# Patient Record
Sex: FEMALE | Race: WHITE | NOT HISPANIC OR LATINO | Employment: FULL TIME
[De-identification: names, ages, dates, MRNs, and addresses within clinical notes are randomized per-mention and may not be internally consistent; named-entity substitution may affect disease eponyms.]

---

## 2017-09-17 ENCOUNTER — HEALTH MAINTENANCE LETTER (OUTPATIENT)
Age: 32
End: 2017-09-17

## 2019-11-09 ENCOUNTER — HEALTH MAINTENANCE LETTER (OUTPATIENT)
Age: 34
End: 2019-11-09

## 2019-12-16 ENCOUNTER — RECORDS - HEALTHEAST (OUTPATIENT)
Dept: ADMINISTRATIVE | Facility: OTHER | Age: 34
End: 2019-12-16

## 2019-12-18 ENCOUNTER — RECORDS - HEALTHEAST (OUTPATIENT)
Dept: ADMINISTRATIVE | Facility: OTHER | Age: 34
End: 2019-12-18

## 2020-01-07 ENCOUNTER — RECORDS - HEALTHEAST (OUTPATIENT)
Dept: ADMINISTRATIVE | Facility: OTHER | Age: 35
End: 2020-01-07

## 2020-02-23 ENCOUNTER — HEALTH MAINTENANCE LETTER (OUTPATIENT)
Age: 35
End: 2020-02-23

## 2020-04-24 ENCOUNTER — RECORDS - HEALTHEAST (OUTPATIENT)
Dept: ADMINISTRATIVE | Facility: OTHER | Age: 35
End: 2020-04-24

## 2020-12-06 ENCOUNTER — HEALTH MAINTENANCE LETTER (OUTPATIENT)
Age: 35
End: 2020-12-06

## 2021-09-03 ENCOUNTER — TRANSFERRED RECORDS (OUTPATIENT)
Dept: HEALTH INFORMATION MANAGEMENT | Facility: CLINIC | Age: 36
End: 2021-09-03

## 2021-09-26 ENCOUNTER — HEALTH MAINTENANCE LETTER (OUTPATIENT)
Age: 36
End: 2021-09-26

## 2022-01-15 ENCOUNTER — HEALTH MAINTENANCE LETTER (OUTPATIENT)
Age: 37
End: 2022-01-15

## 2022-03-08 ENCOUNTER — TRANSFERRED RECORDS (OUTPATIENT)
Dept: HEALTH INFORMATION MANAGEMENT | Facility: CLINIC | Age: 37
End: 2022-03-08
Payer: COMMERCIAL

## 2022-03-14 ENCOUNTER — MEDICAL CORRESPONDENCE (OUTPATIENT)
Dept: HEALTH INFORMATION MANAGEMENT | Facility: CLINIC | Age: 37
End: 2022-03-14
Payer: COMMERCIAL

## 2022-03-15 ENCOUNTER — MEDICAL CORRESPONDENCE (OUTPATIENT)
Dept: HEALTH INFORMATION MANAGEMENT | Facility: CLINIC | Age: 37
End: 2022-03-15
Payer: COMMERCIAL

## 2022-03-16 ENCOUNTER — TRANSCRIBE ORDERS (OUTPATIENT)
Dept: MATERNAL FETAL MEDICINE | Facility: CLINIC | Age: 37
End: 2022-03-16
Payer: COMMERCIAL

## 2022-03-16 DIAGNOSIS — O26.90 PREGNANCY RELATED CONDITION: Primary | ICD-10-CM

## 2022-03-29 ENCOUNTER — TRANSCRIBE ORDERS (OUTPATIENT)
Dept: MATERNAL FETAL MEDICINE | Facility: CLINIC | Age: 37
End: 2022-03-29
Payer: COMMERCIAL

## 2022-03-29 DIAGNOSIS — O26.90 PREGNANCY RELATED CONDITION, ANTEPARTUM: Primary | ICD-10-CM

## 2022-03-29 DIAGNOSIS — O09.92 HIGH-RISK PREGNANCY IN SECOND TRIMESTER: ICD-10-CM

## 2022-03-29 DIAGNOSIS — K50.90 CROHN'S DISEASE (H): Primary | ICD-10-CM

## 2022-03-29 DIAGNOSIS — Z90.49 STATUS POST SMALL BOWEL RESECTION: ICD-10-CM

## 2022-04-08 ENCOUNTER — TRANSFERRED RECORDS (OUTPATIENT)
Dept: HEALTH INFORMATION MANAGEMENT | Facility: CLINIC | Age: 37
End: 2022-04-08
Payer: COMMERCIAL

## 2022-05-17 ENCOUNTER — PRE VISIT (OUTPATIENT)
Dept: MATERNAL FETAL MEDICINE | Facility: CLINIC | Age: 37
End: 2022-05-17
Payer: COMMERCIAL

## 2022-05-24 ENCOUNTER — HOSPITAL ENCOUNTER (OUTPATIENT)
Dept: ULTRASOUND IMAGING | Facility: CLINIC | Age: 37
Discharge: HOME | End: 2022-05-24
Attending: OBSTETRICS & GYNECOLOGY
Payer: COMMERCIAL

## 2022-05-24 ENCOUNTER — OFFICE VISIT (OUTPATIENT)
Dept: MATERNAL FETAL MEDICINE | Facility: CLINIC | Age: 37
End: 2022-05-24
Attending: ADVANCED PRACTICE MIDWIFE
Payer: COMMERCIAL

## 2022-05-24 DIAGNOSIS — O09.92 HIGH-RISK PREGNANCY IN SECOND TRIMESTER: ICD-10-CM

## 2022-05-24 DIAGNOSIS — O09.522 MULTIGRAVIDA OF ADVANCED MATERNAL AGE IN SECOND TRIMESTER: ICD-10-CM

## 2022-05-24 DIAGNOSIS — K50.90 CROHN'S DISEASE (H): ICD-10-CM

## 2022-05-24 DIAGNOSIS — O35.03X0 CHOROID PLEXUS CYST OF FETUS AFFECTING CARE OF MOTHER, ANTEPARTUM, NOT APPLICABLE OR UNSPECIFIED FETUS: ICD-10-CM

## 2022-05-24 DIAGNOSIS — O26.90 PREGNANCY RELATED CONDITION, ANTEPARTUM: Primary | ICD-10-CM

## 2022-05-24 DIAGNOSIS — Z90.49 STATUS POST SMALL BOWEL RESECTION: ICD-10-CM

## 2022-05-24 PROCEDURE — 76811 OB US DETAILED SNGL FETUS: CPT | Mod: 26 | Performed by: OBSTETRICS & GYNECOLOGY

## 2022-05-24 PROCEDURE — 99202 OFFICE O/P NEW SF 15 MIN: CPT | Mod: 25 | Performed by: OBSTETRICS & GYNECOLOGY

## 2022-05-24 PROCEDURE — 76811 OB US DETAILED SNGL FETUS: CPT

## 2022-05-24 RX ORDER — MONTELUKAST SODIUM 10 MG/1
1 TABLET ORAL AT BEDTIME
COMMUNITY
Start: 2021-02-05

## 2022-05-24 RX ORDER — SYRINGE W-NEEDLE,DISPOSAB,3 ML 25GX5/8"
SYRINGE, EMPTY DISPOSABLE MISCELLANEOUS
COMMUNITY
Start: 2021-10-20

## 2022-05-24 NOTE — NURSING NOTE
Cecily, accompanied by her SO, here in clinic for L2 and MFM consult. Dr. Leger and Dr. Bruce met with pt (see consult note).  Jackie Morle RN

## 2022-05-24 NOTE — PROGRESS NOTES
Maternal-Fetal Medicine Consultation    Cecily Hartman  : 1985  MRN: [id number]    REFERRAL:  Cecily Hartman is a 37 year old sent by Ms. Chavira from [facility] for MFM consultation.    HPI:  Cecily Hartman is a 37 year old  at 19w4d by LMP consistent with 8w4d US here for MFM consultation regarding Crohn's disease.    She is here with her partner, Pito.    She was diagnosed with Crohn's disease 2003. She presented with abdominal pain and underwent colonoscopy, which was normal. She was then diagnosed with a perforated small bowel and underwent small bowel resection, in which 1 foot of the bowel was removed, including the foot of the terminal ileum. She has been intermittently been seen by GI at [place], Dr. Dong. Had a colonoscopy and biopsy on 2019, which was consistent with Crohn's. She is not currently on any medications for her Crohn' disease. She has done very well and has not had any flares recently. She notes adequate control during her previous pregnancies. She has done glucose tolerance tests in her previous pregnancies without dumping syndrome. She was previously diagnosed with B12 deficiency, though on her last check on 3/8/2022, it was normal at 246.     Prenatal Care:  Primary OB care this pregnancy has been with MsMansi Fan from [facility].    Obstetrics History:  OB History    Para Term  AB Living   4 3 3 0 0 3   SAB IAB Ectopic Multiple Live Births   0 0 0 0 3      # Outcome Date GA Lbr Yoav/2nd Weight Sex Delivery Anes PTL Lv   4 Current            3 Term 14 38w1d  3.314 kg (7 lb 4.9 oz) M Vag-Spont  N EHSAN      Apgar1: 9  Apgar5: 9   2 Term 09 39w0d  3.785 kg (8 lb 5.5 oz) M Vag-Spont   EHSAN      Birth Comments: LML, no extension, near brow presentation      Name: Randy      Apgar1: 9  Apgar5: 9   1 Term 07 37w0d 04:00 3.487 kg (7 lb 11 oz) M Vag-Spont  Y EHSAN      Birth Comments: 4th depgree laceration      Name: Xavi       Apgar1: 9  Apgar5: 9       Past Medical History:  Past Medical History:   Diagnosis Date     Bowel perforation (H) 2003     Childhood asthma      Crohn's disease (H) 2003    Primary GI: Geeta Cooper       Past Surgical History:  Past Surgical History:   Procedure Laterality Date     CARDIAC ELECTROPHYSIOLOGY MAPPING AND ABLATION  2013    HX SVT     OTHER SURGICAL HISTORY  2012    Cardiac Ablation     SMALL BOWEL RESECTION  2003    Along with an Appy     [facility] APPENDECTOMY  2003    Description: Appendectomy;  Recorded: 11/19/2009;     [facility] ORAL SURGERY PROCEDURE  2001    4 wisdom teeth     [facility] RESECT SMALL INTEST,SINGL RESEC/ANAS  2003    Description: Small Bowel Resection;  Recorded: 11/19/2009;       Current Medications:  Prior to Admission medications    Medication Sig Last Dose Taking? Auth Provider   FOLIC ACID 1 MG OR TABS ONE DAILY   Abdi Cheung MD   PENTASA 500 MG OR CPCR 2 tabs 4 times a day   Abdi Cheung MD   PRENATAL VITAMIN TABS   OR ONE DAILY   Abdi Cheung MD   VITAMIN B-12 1000 MCG OR TABS    Abdi Cheung MD   ZANTAC 150 MG OR TABS ONE TABLET TWICE DAILY   Abdi Cheung MD       Allergies:  Patient has no known allergies.    Social History:   Occupation: Nurse practioner at [employer]  Denies use of alcohol, drugs or smoking.    Family History:  Denies history of genetic disorders, preeclampsia, thromboembolic disease, bleeding disorders, developmental delay.    ROS:  10-point ROS negative except as in HPI     PHYSICAL EXAM:  Deferred     Other Imaging:   PREGNANCY  ---------------------------------------------------------------------------------------------------------  Tavera pregnancy. Number of fetuses: 1        DATING  ---------------------------------------------------------------------------------------------------------                                           Date                                Details                                                                                       Gest. age                      YASH  LMP                                  1/7/2022                                                                                                                           19 w + 4 d                     10/14/2022  Prior assessment               3/8/2022                          GA: 8 w + 4 d                                                                             19 w + 4 d                     10/14/2022  U/S                                   5/24/2022                         based upon AC, BPD, Femur, HC                                                19 w + 2 d                     10/16/2022  Assigned dating                  Dating performed on 05/24/2022, based on the LMP                                                            19 w + 4 d                     10/14/2022        GENERAL EVALUATION  ---------------------------------------------------------------------------------------------------------  Cardiac activity present.  bpm.  Fetal movements present.  Presentation cephalic.  Placenta Anterior, anterior, low lying.  Umbilical cord 3 vessel cord.  Amniotic fluid normal MVP, MVP 5.5 cm.        FETAL BIOMETRY  ---------------------------------------------------------------------------------------------------------  Main Fetal Biometry:  BPD                                        42.8                    mm                         19w 0d                Hadlock  OFD                                        60.1                    mm                         19w 4d                Nicolaides  HC                                          165.8                  mm                          19w 2d                Hadlock  Cerebellum tr                            20.7                   mm                          19w 5d                Nicolaides  AC                                          142.5                  mm                           19w 4d        46%        Hadlock  Femur                                      30.3                   mm                          19w 3d                Hadlock  Humerus                                  30.2                    mm                         20w 0d                Maryellen  Fetal Weight Calculation:  EFW                                       294                     g                                     38%        Hadlock  EFW (lb,oz)                             0 lb 10                 oz  EFW by                                        Hadlock (BPD-HC-AC-FL)  Head / Face / Neck Biometry:                                             5.8                     mm  CM                                          3.5                     mm  Nasal bone                               5.2                     mm  Nuchal fold                               5.0                     mm        FETAL ANATOMY  ---------------------------------------------------------------------------------------------------------  Head / Neck                         Left choroid plexus: cyst     The following structures appear normal:  Head / Neck                         Cranium. Head size. Head shape. Lateral ventricles. Midline falx. Cavum septi pellucidi. Cerebellum. Cisterna magna. Parenchyma. Thalami.                                             Vermis.                                             Neck. Nuchal fold.  Face                                   Lips. Profile. Nose. Maxilla. Mandible. Orbits. Lens.  Heart / Thorax                      4-chamber view. RVOT view. LVOT view. Situs. Aortic arch view. Bicaval view. Ductal arch view. Superior vena cava. Inferior vena cava. 3-vessel                                             view. 3-vessel-trachea view. Cardiac position. Cardiac size. Cardiac rhythm.                                             Right lung. Left lung. Diaphragm.  Abdomen                             Abdominal  wall. Cord insertion. Stomach. Kidneys. Bladder. Liver. Bowel. Genitals.  Spine                                  Cervical spine. Thoracic spine. Lumbar spine. Sacral spine.  Extremities / Skeleton          Right arm. Right hand. Left arm. Left hand. Right leg. Right foot. Left leg. Left foot.     Gender: female.        MATERNAL STRUCTURES  ---------------------------------------------------------------------------------------------------------  Cervix                                  Visualized                                             Appearance: Appears Closed                                             Cervical length 41.0 mm  Right Ovary                          Visualized  Left Ovary                            Visualized        RECOMMENDATION  ---------------------------------------------------------------------------------------------------------  Thank-you for referring your patient for a comprehensive ultrasound. She had low risk first trimester screen.     I discussed the findings on today's ultrasound with the patient. I reviewed the limitations of ultrasound both in detecting aneuploidy and structural abnormalities. We  discussed the finding of the unilateral choroid plexus cyst. We discussed that choroid plexus cysts are seen in normal pregnancies as an isolated finding with no clinically  significant impact. They can be unilateral or bilateral and result from the trapping of CSF within the choroid plexus. We reviewed that in the setting of her otherwise  structurally normal ultrasound today this is likely to be a normal variant and requires no additional follow-up. We also reviewed that this finding does not impact structural  brain development or function.     The placenta today is 1.3 cm from the cervical os, which is low lying. As pregnancy progresses this finding is very likely to resolve prior to delivery. A follow up ultrasound is  recommended in your office in the third trimester to reevaluate  fetal growth in the setting of maternal COVID infection in pregnancy.     Cecily also had a consultation today. Please see Epic for full documentation of that encounter.     Return to primary provider for continued prenatal care.     If you have questions regarding today's evaluation or if we can be of further service, please contact the [facility].     **Fetal anomalies may be present but not detected**                                                                         IMPRESSION  ---------------------------------------------------------------------------------------------------------  1) Tavera intrauterine pregnancy at 19w 4d gestational age.  2) There is a small choroid plexus cyst. Otherwise, none of the anomalies commonly detected by ultrasound were evident in the detailed fetal anatomic survey as described  above.  3) Growth parameters and estimated fetal weight were consistent with established dates.  4) The amniotic fluid volume appeared normal.  5) Normal fetal activity for gestational age.  6) On transabdominal imaging the cervix appears long and closed.      ASSESSMENT/PLAN:  Cecily Hartman is a 37 year old  at 19w4d by LMP consistent with 8w4d US here for [facility] consultation regarding:     Crohn's Disease  We discussed that risks of IBD in pregnancy include miscarriage, fetal growth restriction/small for gestational age infant, premature delivery, poor maternal weight gain, and complications of labor and delivery including increased risk of preeclampsia, abruption, and  delivery. Specifically these risks are significantly increased in the presence of active disease. We reviewed that given that Ms. Hartman's Crohn's disease has been relatively quiescent, her risks of these complications are quite low. Furthermore, she has tolerated previous pregnancies from a GI perspective well without flares. We discussed that given her terminal ileum involvement and prior  resection, she is at risk for vitamin B12 deficiency. She was checked at the beginning of this pregnancy and her Vitamin B12 level was notably normal.     We discussed that mode of delivery should be dictated by the usual obstetric indications unless the patient has a history of a prior rectovaginal fistula or there is evidence of active perianal disease, which can theoretically predispose to fistula formation if episiotomy or obstetrical/perineal laceration is incurred. Women with IBD who undergo  delivery should receive prophylactic lovenox during their hospitalization     Recommendations:  - She previously had low risk serum aneuploidy screening.  - She had a normal Level II ultrasound today.  - A follow up ultrasound in the third trimester is recommended due to maternal COVID in pregnancy and low lying placenta  - Given Crohn's disease, low dose aspirin for preeclampsia not recommended.    The patient was seen and evaluated with Dr. Leger.    Thank you for allowing us to participate in the care of your patient. Please do not hesitate to contact us if you have further questions regarding the management of your patient.       Yvette Bruce MD  Maternal Fetal Medicine Fellow    [facility] Attending Attestation    I have seen and evaluated the patient myself with the fellow. I spent a total of 20 minutes on the day of the encounter including counseling, coordination of care, review of records and documentation.    Natividad Leger MD  Maternal Fetal Medicine

## 2022-07-11 ENCOUNTER — MEDICAL CORRESPONDENCE (OUTPATIENT)
Dept: HEALTH INFORMATION MANAGEMENT | Facility: CLINIC | Age: 37
End: 2022-07-11

## 2022-07-15 ENCOUNTER — TRANSFERRED RECORDS (OUTPATIENT)
Dept: HEALTH INFORMATION MANAGEMENT | Facility: CLINIC | Age: 37
End: 2022-07-15

## 2022-07-15 ENCOUNTER — TRANSCRIBE ORDERS (OUTPATIENT)
Dept: MATERNAL FETAL MEDICINE | Facility: CLINIC | Age: 37
End: 2022-07-15

## 2022-07-15 DIAGNOSIS — O26.90 PREGNANCY RELATED CONDITION: Primary | ICD-10-CM

## 2022-08-05 ENCOUNTER — HOSPITAL ENCOUNTER (OUTPATIENT)
Dept: ULTRASOUND IMAGING | Facility: CLINIC | Age: 37
Discharge: HOME | End: 2022-08-05
Payer: COMMERCIAL

## 2022-08-05 ENCOUNTER — OFFICE VISIT (OUTPATIENT)
Dept: MATERNAL FETAL MEDICINE | Facility: CLINIC | Age: 37
End: 2022-08-05
Payer: COMMERCIAL

## 2022-08-05 DIAGNOSIS — O26.90 PREGNANCY RELATED CONDITION: ICD-10-CM

## 2022-08-05 DIAGNOSIS — K50.919 CROHN'S DISEASE WITH COMPLICATION, UNSPECIFIED GASTROINTESTINAL TRACT LOCATION (H): Primary | ICD-10-CM

## 2022-08-05 PROCEDURE — 76816 OB US FOLLOW-UP PER FETUS: CPT | Mod: 26 | Performed by: OBSTETRICS & GYNECOLOGY

## 2022-08-05 PROCEDURE — 76816 OB US FOLLOW-UP PER FETUS: CPT

## 2022-08-05 NOTE — PROGRESS NOTES
"Please see \"Imaging\" tab under \"Chart Review\" for details of today's visit.    Jie Haq MD PhD  Maternal Fetal Medicine     "

## 2022-10-07 ENCOUNTER — HOSPITAL ENCOUNTER (INPATIENT)
Facility: CLINIC | Age: 37
LOS: 1 days | Discharge: HOME | End: 2022-10-08
Attending: OBSTETRICS & GYNECOLOGY | Admitting: OBSTETRICS & GYNECOLOGY
Payer: COMMERCIAL

## 2022-10-07 ENCOUNTER — ANESTHESIA EVENT (OUTPATIENT)
Dept: OBGYN | Facility: CLINIC | Age: 37
End: 2022-10-07
Payer: COMMERCIAL

## 2022-10-07 ENCOUNTER — ANESTHESIA (OUTPATIENT)
Dept: OBGYN | Facility: CLINIC | Age: 37
End: 2022-10-07
Payer: COMMERCIAL

## 2022-10-07 LAB
ABO/RH(D): NORMAL
ANTIBODY SCREEN: NEGATIVE
HGB BLD-MCNC: 11.9 G/DL (ref 11.7–15.7)
SPECIMEN EXPIRATION DATE: NORMAL
T PALLIDUM AB SER QL: NONREACTIVE

## 2022-10-07 PROCEDURE — 250N000011 HC RX IP 250 OP 636: Performed by: ANESTHESIOLOGY

## 2022-10-07 PROCEDURE — 999N000016 HC STATISTIC ATTENDANCE AT DELIVERY

## 2022-10-07 PROCEDURE — 722N000001 HC LABOR CARE VAGINAL DELIVERY SINGLE

## 2022-10-07 PROCEDURE — 258N000003 HC RX IP 258 OP 636: Performed by: ANESTHESIOLOGY

## 2022-10-07 PROCEDURE — 3E0R3BZ INTRODUCTION OF ANESTHETIC AGENT INTO SPINAL CANAL, PERCUTANEOUS APPROACH: ICD-10-PCS | Performed by: ANESTHESIOLOGY

## 2022-10-07 PROCEDURE — 86901 BLOOD TYPING SEROLOGIC RH(D): CPT | Performed by: OBSTETRICS & GYNECOLOGY

## 2022-10-07 PROCEDURE — 85018 HEMOGLOBIN: CPT | Performed by: OBSTETRICS & GYNECOLOGY

## 2022-10-07 PROCEDURE — 86780 TREPONEMA PALLIDUM: CPT | Performed by: OBSTETRICS & GYNECOLOGY

## 2022-10-07 PROCEDURE — 00HU33Z INSERTION OF INFUSION DEVICE INTO SPINAL CANAL, PERCUTANEOUS APPROACH: ICD-10-PCS | Performed by: ANESTHESIOLOGY

## 2022-10-07 PROCEDURE — 250N000009 HC RX 250: Performed by: OBSTETRICS & GYNECOLOGY

## 2022-10-07 PROCEDURE — 258N000003 HC RX IP 258 OP 636: Performed by: OBSTETRICS & GYNECOLOGY

## 2022-10-07 PROCEDURE — 120N000001 HC R&B MED SURG/OB

## 2022-10-07 PROCEDURE — 250N000013 HC RX MED GY IP 250 OP 250 PS 637: Performed by: OBSTETRICS & GYNECOLOGY

## 2022-10-07 PROCEDURE — 250N000011 HC RX IP 250 OP 636: Performed by: OBSTETRICS & GYNECOLOGY

## 2022-10-07 PROCEDURE — 10907ZC DRAINAGE OF AMNIOTIC FLUID, THERAPEUTIC FROM PRODUCTS OF CONCEPTION, VIA NATURAL OR ARTIFICIAL OPENING: ICD-10-PCS | Performed by: OBSTETRICS & GYNECOLOGY

## 2022-10-07 PROCEDURE — 370N000003 HC ANESTHESIA WARD SERVICE

## 2022-10-07 RX ORDER — IBUPROFEN 800 MG/1
800 TABLET, FILM COATED ORAL
Status: DISCONTINUED | OUTPATIENT
Start: 2022-10-07 | End: 2022-10-07

## 2022-10-07 RX ORDER — METOCLOPRAMIDE HYDROCHLORIDE 5 MG/ML
10 INJECTION INTRAMUSCULAR; INTRAVENOUS EVERY 6 HOURS PRN
Status: DISCONTINUED | OUTPATIENT
Start: 2022-10-07 | End: 2022-10-07 | Stop reason: HOSPADM

## 2022-10-07 RX ORDER — LIDOCAINE 40 MG/G
CREAM TOPICAL
Status: DISCONTINUED | OUTPATIENT
Start: 2022-10-07 | End: 2022-10-07 | Stop reason: HOSPADM

## 2022-10-07 RX ORDER — ACETAMINOPHEN 325 MG/1
650 TABLET ORAL EVERY 4 HOURS PRN
Status: DISCONTINUED | OUTPATIENT
Start: 2022-10-07 | End: 2022-10-07 | Stop reason: HOSPADM

## 2022-10-07 RX ORDER — NALOXONE HYDROCHLORIDE 0.4 MG/ML
0.2 INJECTION, SOLUTION INTRAMUSCULAR; INTRAVENOUS; SUBCUTANEOUS
Status: DISCONTINUED | OUTPATIENT
Start: 2022-10-07 | End: 2022-10-07 | Stop reason: HOSPADM

## 2022-10-07 RX ORDER — MISOPROSTOL 200 UG/1
400 TABLET ORAL
Status: DISCONTINUED | OUTPATIENT
Start: 2022-10-07 | End: 2022-10-09 | Stop reason: HOSPADM

## 2022-10-07 RX ORDER — ONDANSETRON 2 MG/ML
4 INJECTION INTRAMUSCULAR; INTRAVENOUS EVERY 6 HOURS PRN
Status: DISCONTINUED | OUTPATIENT
Start: 2022-10-07 | End: 2022-10-07 | Stop reason: HOSPADM

## 2022-10-07 RX ORDER — TRANEXAMIC ACID 10 MG/ML
1 INJECTION, SOLUTION INTRAVENOUS EVERY 30 MIN PRN
Status: DISCONTINUED | OUTPATIENT
Start: 2022-10-07 | End: 2022-10-09 | Stop reason: HOSPADM

## 2022-10-07 RX ORDER — KETOROLAC TROMETHAMINE 30 MG/ML
30 INJECTION, SOLUTION INTRAMUSCULAR; INTRAVENOUS
Status: DISCONTINUED | OUTPATIENT
Start: 2022-10-07 | End: 2022-10-07

## 2022-10-07 RX ORDER — OXYTOCIN/0.9 % SODIUM CHLORIDE 30/500 ML
100-340 PLASTIC BAG, INJECTION (ML) INTRAVENOUS CONTINUOUS PRN
Status: DISCONTINUED | OUTPATIENT
Start: 2022-10-07 | End: 2022-10-09 | Stop reason: HOSPADM

## 2022-10-07 RX ORDER — OXYTOCIN 10 [USP'U]/ML
10 INJECTION, SOLUTION INTRAMUSCULAR; INTRAVENOUS
Status: DISCONTINUED | OUTPATIENT
Start: 2022-10-07 | End: 2022-10-07 | Stop reason: HOSPADM

## 2022-10-07 RX ORDER — METHYLERGONOVINE MALEATE 0.2 MG/ML
200 INJECTION INTRAVENOUS
Status: DISCONTINUED | OUTPATIENT
Start: 2022-10-07 | End: 2022-10-09 | Stop reason: HOSPADM

## 2022-10-07 RX ORDER — ONDANSETRON 4 MG/1
4 TABLET, ORALLY DISINTEGRATING ORAL EVERY 6 HOURS PRN
Status: DISCONTINUED | OUTPATIENT
Start: 2022-10-07 | End: 2022-10-07 | Stop reason: HOSPADM

## 2022-10-07 RX ORDER — ALUMINUM HYDROXIDE AND MAGNESIUM TRISILICATE 80; 14.2 MG/1; MG/1
2 TABLET, CHEWABLE ORAL EVERY 4 HOURS PRN
COMMUNITY

## 2022-10-07 RX ORDER — MODIFIED LANOLIN
OINTMENT (GRAM) TOPICAL
Status: DISCONTINUED | OUTPATIENT
Start: 2022-10-07 | End: 2022-10-09 | Stop reason: HOSPADM

## 2022-10-07 RX ORDER — OXYTOCIN 10 [USP'U]/ML
10 INJECTION, SOLUTION INTRAMUSCULAR; INTRAVENOUS
Status: DISCONTINUED | OUTPATIENT
Start: 2022-10-07 | End: 2022-10-09 | Stop reason: HOSPADM

## 2022-10-07 RX ORDER — CALCIUM CARBONATE 500 MG/1
500 TABLET, CHEWABLE ORAL DAILY PRN
Status: DISCONTINUED | OUTPATIENT
Start: 2022-10-07 | End: 2022-10-09 | Stop reason: HOSPADM

## 2022-10-07 RX ORDER — MISOPROSTOL 200 UG/1
400 TABLET ORAL
Status: DISCONTINUED | OUTPATIENT
Start: 2022-10-07 | End: 2022-10-07 | Stop reason: HOSPADM

## 2022-10-07 RX ORDER — OXYTOCIN/0.9 % SODIUM CHLORIDE 30/500 ML
340 PLASTIC BAG, INJECTION (ML) INTRAVENOUS CONTINUOUS PRN
Status: DISCONTINUED | OUTPATIENT
Start: 2022-10-07 | End: 2022-10-07 | Stop reason: HOSPADM

## 2022-10-07 RX ORDER — KETOROLAC TROMETHAMINE 30 MG/ML
30 INJECTION, SOLUTION INTRAMUSCULAR; INTRAVENOUS EVERY 6 HOURS PRN
Status: DISCONTINUED | OUTPATIENT
Start: 2022-10-07 | End: 2022-10-09 | Stop reason: HOSPADM

## 2022-10-07 RX ORDER — CARBOPROST TROMETHAMINE 250 UG/ML
250 INJECTION, SOLUTION INTRAMUSCULAR
Status: DISCONTINUED | OUTPATIENT
Start: 2022-10-07 | End: 2022-10-07 | Stop reason: HOSPADM

## 2022-10-07 RX ORDER — CARBOPROST TROMETHAMINE 250 UG/ML
250 INJECTION, SOLUTION INTRAMUSCULAR
Status: DISCONTINUED | OUTPATIENT
Start: 2022-10-07 | End: 2022-10-09 | Stop reason: HOSPADM

## 2022-10-07 RX ORDER — CITRIC ACID/SODIUM CITRATE 334-500MG
30 SOLUTION, ORAL ORAL
Status: DISCONTINUED | OUTPATIENT
Start: 2022-10-07 | End: 2022-10-07 | Stop reason: HOSPADM

## 2022-10-07 RX ORDER — MISOPROSTOL 200 UG/1
800 TABLET ORAL
Status: DISCONTINUED | OUTPATIENT
Start: 2022-10-07 | End: 2022-10-09 | Stop reason: HOSPADM

## 2022-10-07 RX ORDER — FENTANYL CITRATE 50 UG/ML
INJECTION, SOLUTION INTRAMUSCULAR; INTRAVENOUS
Status: DISCONTINUED
Start: 2022-10-07 | End: 2022-10-07 | Stop reason: WASHOUT

## 2022-10-07 RX ORDER — HYDROCORTISONE 25 MG/G
CREAM TOPICAL 3 TIMES DAILY PRN
Status: DISCONTINUED | OUTPATIENT
Start: 2022-10-07 | End: 2022-10-09 | Stop reason: HOSPADM

## 2022-10-07 RX ORDER — FENTANYL CITRATE-0.9 % NACL/PF 10 MCG/ML
100 PLASTIC BAG, INJECTION (ML) INTRAVENOUS EVERY 5 MIN PRN
Status: DISCONTINUED | OUTPATIENT
Start: 2022-10-07 | End: 2022-10-07 | Stop reason: HOSPADM

## 2022-10-07 RX ORDER — OXYTOCIN/0.9 % SODIUM CHLORIDE 30/500 ML
1-24 PLASTIC BAG, INJECTION (ML) INTRAVENOUS CONTINUOUS
Status: DISCONTINUED | OUTPATIENT
Start: 2022-10-07 | End: 2022-10-07 | Stop reason: HOSPADM

## 2022-10-07 RX ORDER — METOCLOPRAMIDE 10 MG/1
10 TABLET ORAL EVERY 6 HOURS PRN
Status: DISCONTINUED | OUTPATIENT
Start: 2022-10-07 | End: 2022-10-07 | Stop reason: HOSPADM

## 2022-10-07 RX ORDER — ACETAMINOPHEN 325 MG/1
650 TABLET ORAL EVERY 4 HOURS PRN
Status: DISCONTINUED | OUTPATIENT
Start: 2022-10-07 | End: 2022-10-09 | Stop reason: HOSPADM

## 2022-10-07 RX ORDER — NALBUPHINE HYDROCHLORIDE 10 MG/ML
2.5-5 INJECTION, SOLUTION INTRAMUSCULAR; INTRAVENOUS; SUBCUTANEOUS EVERY 6 HOURS PRN
Status: DISCONTINUED | OUTPATIENT
Start: 2022-10-07 | End: 2022-10-09 | Stop reason: HOSPADM

## 2022-10-07 RX ORDER — MONTELUKAST SODIUM 10 MG/1
10 TABLET ORAL AT BEDTIME
Status: CANCELLED | OUTPATIENT
Start: 2022-10-07

## 2022-10-07 RX ORDER — PROCHLORPERAZINE 25 MG
25 SUPPOSITORY, RECTAL RECTAL EVERY 12 HOURS PRN
Status: DISCONTINUED | OUTPATIENT
Start: 2022-10-07 | End: 2022-10-07 | Stop reason: HOSPADM

## 2022-10-07 RX ORDER — PROCHLORPERAZINE MALEATE 10 MG
10 TABLET ORAL EVERY 6 HOURS PRN
Status: DISCONTINUED | OUTPATIENT
Start: 2022-10-07 | End: 2022-10-07 | Stop reason: HOSPADM

## 2022-10-07 RX ORDER — FENTANYL CITRATE 50 UG/ML
50 INJECTION, SOLUTION INTRAMUSCULAR; INTRAVENOUS EVERY 30 MIN PRN
Status: DISCONTINUED | OUTPATIENT
Start: 2022-10-07 | End: 2022-10-07 | Stop reason: HOSPADM

## 2022-10-07 RX ORDER — OXYTOCIN/0.9 % SODIUM CHLORIDE 30/500 ML
340 PLASTIC BAG, INJECTION (ML) INTRAVENOUS CONTINUOUS PRN
Status: DISCONTINUED | OUTPATIENT
Start: 2022-10-07 | End: 2022-10-09 | Stop reason: HOSPADM

## 2022-10-07 RX ORDER — TRANEXAMIC ACID 10 MG/ML
1 INJECTION, SOLUTION INTRAVENOUS EVERY 30 MIN PRN
Status: DISCONTINUED | OUTPATIENT
Start: 2022-10-07 | End: 2022-10-07 | Stop reason: HOSPADM

## 2022-10-07 RX ORDER — SODIUM CHLORIDE, SODIUM LACTATE, POTASSIUM CHLORIDE, CALCIUM CHLORIDE 600; 310; 30; 20 MG/100ML; MG/100ML; MG/100ML; MG/100ML
INJECTION, SOLUTION INTRAVENOUS CONTINUOUS
Status: DISCONTINUED | OUTPATIENT
Start: 2022-10-07 | End: 2022-10-09 | Stop reason: HOSPADM

## 2022-10-07 RX ORDER — ALBUTEROL SULFATE 90 UG/1
2 AEROSOL, METERED RESPIRATORY (INHALATION) EVERY 6 HOURS PRN
Status: DISCONTINUED | OUTPATIENT
Start: 2022-10-07 | End: 2022-10-09 | Stop reason: HOSPADM

## 2022-10-07 RX ORDER — SODIUM CHLORIDE, SODIUM LACTATE, POTASSIUM CHLORIDE, CALCIUM CHLORIDE 600; 310; 30; 20 MG/100ML; MG/100ML; MG/100ML; MG/100ML
INJECTION, SOLUTION INTRAVENOUS CONTINUOUS PRN
Status: DISCONTINUED | OUTPATIENT
Start: 2022-10-07 | End: 2022-10-07 | Stop reason: HOSPADM

## 2022-10-07 RX ORDER — DOCUSATE SODIUM 100 MG/1
100 CAPSULE, LIQUID FILLED ORAL DAILY
Status: DISCONTINUED | OUTPATIENT
Start: 2022-10-07 | End: 2022-10-09 | Stop reason: HOSPADM

## 2022-10-07 RX ORDER — NALOXONE HYDROCHLORIDE 0.4 MG/ML
0.4 INJECTION, SOLUTION INTRAMUSCULAR; INTRAVENOUS; SUBCUTANEOUS
Status: DISCONTINUED | OUTPATIENT
Start: 2022-10-07 | End: 2022-10-07 | Stop reason: HOSPADM

## 2022-10-07 RX ORDER — MISOPROSTOL 200 UG/1
800 TABLET ORAL
Status: DISCONTINUED | OUTPATIENT
Start: 2022-10-07 | End: 2022-10-07 | Stop reason: HOSPADM

## 2022-10-07 RX ORDER — BUPIVACAINE HYDROCHLORIDE 2.5 MG/ML
INJECTION, SOLUTION EPIDURAL; INFILTRATION; INTRACAUDAL
Status: COMPLETED | OUTPATIENT
Start: 2022-10-07 | End: 2022-10-07

## 2022-10-07 RX ORDER — METHYLERGONOVINE MALEATE 0.2 MG/ML
200 INJECTION INTRAVENOUS
Status: DISCONTINUED | OUTPATIENT
Start: 2022-10-07 | End: 2022-10-07 | Stop reason: HOSPADM

## 2022-10-07 RX ORDER — BISACODYL 10 MG
10 SUPPOSITORY, RECTAL RECTAL DAILY PRN
Status: DISCONTINUED | OUTPATIENT
Start: 2022-10-07 | End: 2022-10-09 | Stop reason: HOSPADM

## 2022-10-07 RX ADMIN — ACETAMINOPHEN 650 MG: 325 TABLET, FILM COATED ORAL at 22:21

## 2022-10-07 RX ADMIN — Medication 1 MILLI-UNITS/MIN: at 11:46

## 2022-10-07 RX ADMIN — DOCUSATE SODIUM 100 MG: 100 CAPSULE, LIQUID FILLED ORAL at 18:13

## 2022-10-07 RX ADMIN — BUPIVACAINE HYDROCHLORIDE 10 ML: 2.5 INJECTION, SOLUTION EPIDURAL; INFILTRATION; INTRACAUDAL at 13:18

## 2022-10-07 RX ADMIN — Medication 100 MCG: at 14:50

## 2022-10-07 RX ADMIN — Medication: at 10:00

## 2022-10-07 RX ADMIN — SODIUM CHLORIDE, POTASSIUM CHLORIDE, SODIUM LACTATE AND CALCIUM CHLORIDE: 600; 310; 30; 20 INJECTION, SOLUTION INTRAVENOUS at 17:39

## 2022-10-07 RX ADMIN — SODIUM CHLORIDE, POTASSIUM CHLORIDE, SODIUM LACTATE AND CALCIUM CHLORIDE: 600; 310; 30; 20 INJECTION, SOLUTION INTRAVENOUS at 09:04

## 2022-10-07 RX ADMIN — Medication 100 MCG: at 14:45

## 2022-10-07 RX ADMIN — BENZOCAINE: 11.4 AEROSOL, SPRAY TOPICAL at 18:13

## 2022-10-07 RX ADMIN — METHYLERGONOVINE MALEATE 200 MCG: 0.2 INJECTION, SOLUTION INTRAMUSCULAR; INTRAVENOUS at 15:10

## 2022-10-07 RX ADMIN — ACETAMINOPHEN 650 MG: 325 TABLET, FILM COATED ORAL at 18:02

## 2022-10-07 RX ADMIN — SODIUM CHLORIDE, POTASSIUM CHLORIDE, SODIUM LACTATE AND CALCIUM CHLORIDE 1000 ML: 600; 310; 30; 20 INJECTION, SOLUTION INTRAVENOUS at 09:30

## 2022-10-07 RX ADMIN — KETOROLAC TROMETHAMINE 30 MG: 30 INJECTION, SOLUTION INTRAMUSCULAR at 15:14

## 2022-10-07 RX ADMIN — KETOROLAC TROMETHAMINE 30 MG: 30 INJECTION, SOLUTION INTRAMUSCULAR at 21:14

## 2022-10-07 RX ADMIN — FENTANYL CITRATE 100 MCG: 50 INJECTION, SOLUTION INTRAMUSCULAR; INTRAVENOUS at 13:06

## 2022-10-07 RX ADMIN — Medication 100 MCG: at 14:38

## 2022-10-07 ASSESSMENT — ACTIVITIES OF DAILY LIVING (ADL)
ADLS_ACUITY_SCORE: 18

## 2022-10-07 NOTE — L&D DELIVERY NOTE
Delivery Date: 10/07/2022    This is a 37-year-old G4, P3-0-0-3, who was admitted for nonmedical induction of labor at 39 weeks' gestation.  The patient's pregnancy was complicated only by advanced maternal age with normal screening.  She had experienced a fourth-degree perineal laceration with her first vaginal delivery and had a right medial-lateral episiotomy to prevent this recurrence in subsequent pregnancies.  She has a history of Crohn's disease, but was stable throughout the pregnancy.  Upon admission, her cervix was found to be 2 cm dilated, 70 percent effaced, -2 station.  Artificial rupture of membranes was performed, yielding a large amount of clear fluid.  GBS was known to be negative.    She then progressed slowly in latent phase and 1 milliunits per minute of oxytocin was initiated, and she went quickly into active labor.  Her labor then progressed normally, with epidural anesthesia placed when she reached 7 cm dilation.  She then progressed to complete dilation, with fetus at +1 station.  Estimated fetal size was 8-1/2 pounds.  Due to strong pelvic pressure despite epidural, second stage was initiated at 1340.  She pushed effectively for approximately 1 hour.  Due to severe pelvic and hip pain, her epidural was then bolused.  After approximately 2 hours of pushing, the patient desired discussion regarding alternative options for delivery.  Pelvis felt to be adequate, and fetal presentation was felt to be straight occiput posterior.  Manual rotation was unsuccessful earlier in the second stage.  The patient was extensively counseled regarding the risks and benefits of an operative vaginal delivery, including the very significant increased risk of perineal lacerations.  With her history of previous fourth-degree laceration and concern regarding this, it was emphasized that this may occur again.  Additionally, the fetal risks were extensively discussed with the patient and her .  The risk of   section was also discussed.    After discussion, the patient opted for a trial of forceps.  The Chuck-Luikart forceps were placed in a phantom application, with the sagittal suture confirmed in the midline.  Fetal presentation was confirmed to be occiput posterior.  The bladder had been drained shortly prior to placement.  Three pulls through one contraction yielded the fetal head to .  The forceps were then removed without complication.  She continued to push through delivery of the majority of the fetal head, at which point a right medial lateral episiotomy was performed.  At this point, the remainder of the fetal head was delivered in a controlled fashion, followed quickly by the left/anterior fetal shoulder and the remainder of a vigorous infant female.  The NICU team was in the room for delivery.  Delayed cord clamping was performed given the vigorous nature of the infant.  Cord blood was then collected.  The placenta was delivered with vigorous uterine massage.  Good uterine tone was noted.  Due to a history of postpartum hemorrhage, the patient was given 0.2 mg of intramuscular Methergine.  IV oxytocin was also administered.    The medial lateral episiotomy was repaired with 3-0 Vicryl in a standard fashion.  Several additional figure-of-eight sutures of 4-0 Vicryl on an SH needle were performed for complete hemostasis.  Extensive rectal exam was performed with no evidence of compromise of previous repair.  Rectocele is noted vaginally.  Estimated blood loss from the delivery was 300 mL.    FINDINGS OF DELIVERY:  Liveborn infant female.  Apgars of 8 and 9.  Weight of 8 pounds 7 ounces.    Billie Fairbanks MD        D: 10/07/2022   T: 10/07/2022   MT: KRISTEL    Name:     FRANCISCO DIAL  MRN:      [id number]        Account:       [id number]   :      1985           Delivery Date: 10/07/2022     Document: [id number]

## 2022-10-07 NOTE — PROVIDER NOTIFICATION
10/07/22 1115   Provider Notification   Provider Name/Title Dr. Fairbanks   Method of Notification Phone   Request Evaluate in Person   Notification Reason Uterine Activity   Catalina every 4.5 minutes, rates pain at 2. Dr. Fairbanks updated per phone. Order received to begin Pitocin at 1 tamia units and increase by 1-2 tamia units as needed.

## 2022-10-07 NOTE — PLAN OF CARE
Data: Cecily Maradiaga transferred to [facility] via wheelchair at 1735. Baby transferred via parent's arms.  Action: Receiving unit notified of transfer: Yes. Patient and family notified of room change. Report given to JEOVANY Estrada at 1735. Belongings sent to receiving unit. Accompanied by Registered Nurse. Oriented patient to surroundings. Call light within reach. ID bands double-checked with receiving RN.  Response: Patient tolerated transfer and is stable.

## 2022-10-07 NOTE — PLAN OF CARE
Pt transferred from L & D to  at 1735 via wheelchair.  ID bracelets checked and verified after pt assisted to bed x 1.  Report received from JEOVANY Zhang - care of pt assumed. Room orientation completed with pt and spouse. Infant safety and safe sleep reviewed along with the clipboard education.      Vital signs stable. Fundus firm, lochia scant.  Pain managed with Tylenol as well as IV Toradol throughout her stay due to inability to safely take Ibuprofen with her Crohn's disease. Pt asked to continue IV fluids at 25 ml/hour due to concern that her IV will clot off without any fluids infusing. Pt is breastfeeding and bonding well with baby.  at bedside and supportive.     Patients mobililty level scored using the bedside mobility assistance tool (BMAT). Patient is at a mobility level test number: 3. Mobility equipment used: none required. Required assist of 1 staff members. Further use of BMAT scoring required.

## 2022-10-07 NOTE — PROVIDER NOTIFICATION
, 39 weeks gestation. Here for induction. Monitors explained and applied. History and prenatal reviewed. 08- Dr. Fairbanks at bedside. AROM, clear fluid. Cervix 2/-2. 0935- Activity encouraged. Up for a walk. 1015- Up in [facility].

## 2022-10-07 NOTE — ANESTHESIA PROCEDURE NOTES
Epidural catheter Procedure Note    Pre-Procedure   Staff -        Anesthesiologist:  Evangelist Hutchins MD       Performed By: anesthesiologist       Referred By: Magdi       Location: OB       Pre-Anesthestic Checklist: patient identified, IV checked, risks and benefits discussed, informed consent, monitors and equipment checked, pre-op evaluation, at physician/surgeon's request and post-op pain management  Timeout:       Correct Patient: Yes        Correct Procedure: Yes        Correct Site: Yes        Correct Position: Yes   Procedure Documentation  Procedure: epidural catheter       Patient Position: sitting       Patient Prep/Sterile Barriers: sterile gloves, mask, patient draped       Skin prep: Betadine       Local skin infiltrated with mL of 1% lidocaine.        Insertion Site: L3-4. (midline approach).       Technique: LORT saline        ANALY at 5 cm.       Needle Type: [employer] needle       Needle Gauge: 17.        Needle Length (Inches): 3.5        Catheter: 19 G.          Catheter threaded easily.         6 cm epidural space.         Threaded 11 cm at skin.         # of attempts: 1 and  # of redirects:     Assessment/Narrative         Paresthesias: No.       Test dose of 3 mL lidocaine 1.5% w/ 1:200,000 epinephrine at 13:15 CDT.         Test dose negative, 3 minutes after injection, for signs of intravascular, subdural, or intrathecal injection.       Insertion/Infusion Method: LORT saline       Aspiration negative for Heme or CSF via Epidural Catheter.    Medication(s) Administered   0.25% Bupivacaine PF (Epidural) - EPIDURAL   10 mL - 10/7/2022 1:18:00 PM

## 2022-10-07 NOTE — PROVIDER NOTIFICATION
1245- Admits to contractions not going away. Pitocin shut off, has had 900 ml fluid (if epidural needed). SVE 5-6/85/-2. Dr. Fairbanks pagemiriam, update given. Will come.

## 2022-10-07 NOTE — H&P
No significant change in general health status based on examination of the patient, review of Nursing Admission Database and prenatal record.    EFW: 7#10oz.     Billie Fairbanks MD

## 2022-10-07 NOTE — ANESTHESIA PREPROCEDURE EVALUATION
Anesthesia Pre-Procedure Evaluation    Patient: Cecily Maradiaga   MRN: [id number] : 1985        Procedure :           Past Medical History:   Diagnosis Date     Bowel perforation (H)      Childhood asthma      Crohn's disease (H)     Primary GI: Geeta Cooper      Past Surgical History:   Procedure Laterality Date     CARDIAC ELECTROPHYSIOLOGY MAPPING AND ABLATION      HX SVT     OTHER SURGICAL HISTORY  2012    Cardiac Ablation     SMALL BOWEL RESECTION      Along with an Appy     [facility] APPENDECTOMY      Description: Appendectomy;  Recorded: 2009;     [facility] ORAL SURGERY PROCEDURE      4 wisdom teeth     [facility] RESECT SMALL INTEST,SINGL RESEC/ANAS      Description: Small Bowel Resection;  Recorded: 2009;      Allergies   Allergen Reactions     Gluten Meal Other (See Comments)     Causes bloating     Lac Bovis Other (See Comments) and Nausea and Vomiting     Mouth sores       Social History     Tobacco Use     Smoking status: Never Smoker     Smokeless tobacco: Not on file   Substance Use Topics     Alcohol use: No      Wt Readings from Last 1 Encounters:   14 49 kg (108 lb)        Anesthesia Evaluation        No history of anesthetic complications       ROS/MED HX  ENT/Pulmonary:     (+) Intermittent, asthma     Neurologic:  - neg neurologic ROS     Cardiovascular:  - neg cardiovascular ROS     METS/Exercise Tolerance:     Hematologic:  - neg hematologic  ROS     Musculoskeletal:       GI/Hepatic:  - neg GI/hepatic ROS     Renal/Genitourinary:       Endo:  - neg endo ROS     Psychiatric/Substance Use:  - neg psychiatric ROS     Infectious Disease:       Malignancy:       Other:     (-) previous  and TOLAC candidate       Physical Exam    Airway        Mallampati: II   TM distance: > 3 FB   Neck ROM: full   Mouth opening: > 3 cm    Respiratory Devices and Support         Dental  no notable dental history         Cardiovascular   cardiovascular exam  normal          Pulmonary   pulmonary exam normal                OUTSIDE LABS:  CBC:   Lab Results   Component Value Date    HGB 11.9 10/07/2022    HGB 12.3 11/13/2008     BMP: No results found for: NA, POTASSIUM, CHLORIDE, CO2, BUN, CR, GLC  COAGS: No results found for: PTT, INR, FIBR  POC: No results found for: BGM, HCG, HCGS  HEPATIC: No results found for: ALBUMIN, PROTTOTAL, ALT, AST, GGT, ALKPHOS, BILITOTAL, BILIDIRECT, BRYCE  OTHER: No results found for: PH, LACT, A1C, ROMEL, PHOS, MAG, LIPASE, AMYLASE, TSH, T4, T3, CRP, SED    Anesthesia Plan    ASA Status:  2      Anesthesia Type: Epidural.              Consents    Anesthesia Plan(s) and associated risks, benefits, and realistic alternatives discussed. Questions answered and patient/representative(s) expressed understanding.    - Discussed:     - Discussed with:  Patient         Postoperative Care            Comments:           neg OB ROS.       Evangelist Hutchins MD

## 2022-10-07 NOTE — PROVIDER NOTIFICATION
1320- Epidural given. 1325- Dr. Fairbanks at bedside, SVE 8.5 cm and 0 station. 1340- Cervix complete and pushing with contractions. Pushing with stirrups, tried knee chest position, complains of pain and hips hurting. 1420, has made little progress with pushing. 1430- Dr. Hutchins here, epidural bolus given and infusion begun. BP low (see flow sheet), given Osman-synephrine. 1502-  team at bedside. Forceps applied, 4 pulls, off at 1503. Delivery at 1506, apgars 8 and 9 respectively.

## 2022-10-08 VITALS
SYSTOLIC BLOOD PRESSURE: 114 MMHG | TEMPERATURE: 98 F | RESPIRATION RATE: 16 BRPM | OXYGEN SATURATION: 90 % | HEART RATE: 95 BPM | DIASTOLIC BLOOD PRESSURE: 71 MMHG

## 2022-10-08 LAB — HGB BLD-MCNC: 9.8 G/DL (ref 11.7–15.7)

## 2022-10-08 PROCEDURE — 36415 COLL VENOUS BLD VENIPUNCTURE: CPT | Performed by: OBSTETRICS & GYNECOLOGY

## 2022-10-08 PROCEDURE — 250N000011 HC RX IP 250 OP 636: Performed by: OBSTETRICS & GYNECOLOGY

## 2022-10-08 PROCEDURE — 250N000013 HC RX MED GY IP 250 OP 250 PS 637: Performed by: OBSTETRICS & GYNECOLOGY

## 2022-10-08 PROCEDURE — 85018 HEMOGLOBIN: CPT | Performed by: OBSTETRICS & GYNECOLOGY

## 2022-10-08 RX ADMIN — KETOROLAC TROMETHAMINE 30 MG: 30 INJECTION, SOLUTION INTRAMUSCULAR at 03:19

## 2022-10-08 RX ADMIN — ALBUTEROL SULFATE 2 PUFF: 90 AEROSOL, METERED RESPIRATORY (INHALATION) at 12:29

## 2022-10-08 RX ADMIN — KETOROLAC TROMETHAMINE 30 MG: 30 INJECTION, SOLUTION INTRAMUSCULAR at 15:55

## 2022-10-08 RX ADMIN — ACETAMINOPHEN 650 MG: 325 TABLET, FILM COATED ORAL at 14:35

## 2022-10-08 RX ADMIN — ACETAMINOPHEN 650 MG: 325 TABLET, FILM COATED ORAL at 02:27

## 2022-10-08 RX ADMIN — KETOROLAC TROMETHAMINE 30 MG: 30 INJECTION, SOLUTION INTRAMUSCULAR at 22:48

## 2022-10-08 RX ADMIN — ALBUTEROL SULFATE 2 PUFF: 90 AEROSOL, METERED RESPIRATORY (INHALATION) at 07:54

## 2022-10-08 RX ADMIN — ACETAMINOPHEN 650 MG: 325 TABLET, FILM COATED ORAL at 18:32

## 2022-10-08 RX ADMIN — ALBUTEROL SULFATE 2 PUFF: 90 AEROSOL, METERED RESPIRATORY (INHALATION) at 18:32

## 2022-10-08 RX ADMIN — DOCUSATE SODIUM 100 MG: 100 CAPSULE, LIQUID FILLED ORAL at 08:03

## 2022-10-08 RX ADMIN — KETOROLAC TROMETHAMINE 30 MG: 30 INJECTION, SOLUTION INTRAMUSCULAR at 09:27

## 2022-10-08 RX ADMIN — ACETAMINOPHEN 650 MG: 325 TABLET, FILM COATED ORAL at 10:23

## 2022-10-08 RX ADMIN — ACETAMINOPHEN 650 MG: 325 TABLET, FILM COATED ORAL at 06:15

## 2022-10-08 ASSESSMENT — ACTIVITIES OF DAILY LIVING (ADL)
ADLS_ACUITY_SCORE: 18
DEPENDENT_IADLS:: INDEPENDENT
ADLS_ACUITY_SCORE: 18

## 2022-10-08 NOTE — PLAN OF CARE
VSS. Up ad roscoe. Breastfeeding and tolerating well, encouraged to call for help when needed. Scant amount of lochia, no clots noted. Tylenol and Toradol for pain control. BS audible/active x4, tolerating PO, denies N/V. Voiding. Bonding well with infant, attentive to cares. Significant other at bedside and supportive. Continue with plan of care.

## 2022-10-08 NOTE — PROGRESS NOTES
[facility]   Obstetrics Progress Note    Subjective: This is the patient's first day since Vaginal delivery. She is doing well.  She is urinating on her own. Pain is controlled with medication.    Objective:   All vitals stable  Temp: 98.6  F (37  C) Temp src: Oral BP: 108/67 Pulse: 79   Resp: 16 SpO2: 90 % O2 Device: None (Room air)      EXAM:  Constitutional: healthy, alert, no distress.   Abdomen: Abdomen soft, non-tender. BS normal. No masses, fundus is firm.  JOINT/EXTREMITIES: extremities normal     Last hemoglobin was   Hemoglobin   Date Value Ref Range Status   10/08/2022 9.8 (L) 11.7 - 15.7 g/dL Final   11/13/2008 12.3 11.7 - 15.7 g/dL Final   ]    Assessment: Stable postpartum course.    Plan: Routine care. Ambulation encouraged  Breast feeding strategies discussed  Pain control measures as needed  Reportable signs and symptoms dicussed with the patient  Discharge later today  PT would like her pain reassessed this afternoon to determine if she can continue with Tylenol oral for pain medication or if she would like to stay overnight to continue with Toradol.     Maru Wheeler MD

## 2022-10-08 NOTE — PLAN OF CARE
Vital signs stable. Pain managed with Toradol and Tylenol.  Pt is ambulating on her own, voiding without difficulty and independent in cares for self and baby.  Breastfeeding baby, going well.  at bedside. Positive interaction noted with baby.  Pt reports she may want to discharge early (today) if baby is cleared for discharge after 24 hour testing. Will continue to monitor.     Care continued from 1500 - 1930:  Pt remains stable. Bonding well with baby.  very supportive at bedside.  Pt is still deciding if she wants to discharge early tonight.  Discussing possible discharge with her .

## 2022-10-08 NOTE — ANESTHESIA POSTPROCEDURE EVALUATION
Patient: Cecily Maradiaga    Procedure: * No procedures listed *       Anesthesia Type:  Epidural    Note:  Disposition: Inpatient   Postop Pain Control:            Sign Out: Well controlled pain   PONV: No   Neuro/Psych: Uneventful            Sign Out: Acceptable/Baseline neuro status   Airway/Respiratory: Uneventful            Sign Out: Acceptable/Baseline resp. status   CV/Hemodynamics: Uneventful            Sign Out: Acceptable CV status; No obvious hypovolemia; No obvious fluid overload   Other NRE: NONE   DID A NON-ROUTINE EVENT OCCUR? No    Event details/Postop Comments:      S/P epidural for labor.   I or my partner was immediately available for management of this patient during epidural analgesia infusion.  VSS.  Doing well. Block resolved.  Neuro at baseline. Denies positional headache. Minimal side effects easily managed w/ PRN meds. No apparent anesthetic complications. No follow-up required.    JULISSA Ocasio MD             Last vitals:  Vitals:    10/07/22 2207 10/08/22 0732 10/08/22 1500   BP:  108/67 114/71   Pulse:  79 95   Resp: 18 16 16   Temp:  98.6  F (37  C) 98  F (36.7  C)   SpO2:          Electronically Signed By: Demond Ocasio MD  October 8, 2022  4:32 PM

## 2022-10-09 NOTE — PLAN OF CARE
Patient meeting expected outcomes and requesting to be discharged home tonight. Discharge orders in by MD earlier today. Discharge education completed, all questions answered. Discharged home with  and  infant at 2300.

## 2023-04-23 ENCOUNTER — HEALTH MAINTENANCE LETTER (OUTPATIENT)
Age: 38
End: 2023-04-23

## 2024-02-10 ENCOUNTER — HEALTH MAINTENANCE LETTER (OUTPATIENT)
Age: 39
End: 2024-02-10

## 2024-06-29 ENCOUNTER — HEALTH MAINTENANCE LETTER (OUTPATIENT)
Age: 39
End: 2024-06-29

## 2025-05-10 ENCOUNTER — ANCILLARY PROCEDURE (OUTPATIENT)
Dept: MAMMOGRAPHY | Facility: CLINIC | Age: 40
End: 2025-05-10
Attending: OBSTETRICS & GYNECOLOGY

## 2025-05-10 DIAGNOSIS — Z12.31 VISIT FOR SCREENING MAMMOGRAM: ICD-10-CM

## 2025-05-20 ENCOUNTER — HOSPITAL ENCOUNTER (OUTPATIENT)
Dept: MAMMOGRAPHY | Facility: CLINIC | Age: 40
Discharge: HOME | End: 2025-05-20
Attending: OBSTETRICS & GYNECOLOGY
Payer: COMMERCIAL

## 2025-05-20 DIAGNOSIS — Z12.31 VISIT FOR SCREENING MAMMOGRAM: ICD-10-CM

## 2025-05-20 PROCEDURE — 77063 BREAST TOMOSYNTHESIS BI: CPT

## 2025-05-28 DIAGNOSIS — R69 DIAGNOSIS UNKNOWN: Primary | ICD-10-CM

## 2025-06-04 NOTE — TELEPHONE ENCOUNTER
REFERRAL INFORMATION:  Referring Provider:    Referring Clinic:    Reason for Visit/Diagnosis: Crohn's, needs to establish GI team      FUTURE VISIT INFORMATION:  Appointment Date: 6/25/25     NOTES STATUS DETAILS   OFFICE NOTE from Referring Provider N/A    OFFICE NOTE from Other Specialist Internal 5/24/22   HOSPITAL DISCHARGE SUMMARY/  ED VISITS In process    OPERATIVE REPORT In process    MEDICATION LIST In process    PROCEDURES     ENDOSCOPY  In process    COLONOSCOPY In process    ERCP In process    EUS In process    STOOL TESTING     H. PYLORI In process    C. DIFF In process    ENTERIC BACTERIA In process    OVA + PARASITE In process    LABS     PERTINENT LABS In process    PATHOLOGY REPORTS (RELATED) In process    IMAGES     MRI In process    CT In process    ULTRASOUND In process    XRAY In process

## 2025-06-25 ENCOUNTER — PRE VISIT (OUTPATIENT)
Dept: GASTROENTEROLOGY | Facility: CLINIC | Age: 40
End: 2025-06-25

## 2025-06-25 ENCOUNTER — VIRTUAL VISIT (OUTPATIENT)
Dept: GASTROENTEROLOGY | Facility: CLINIC | Age: 40
End: 2025-06-25
Payer: COMMERCIAL

## 2025-06-25 DIAGNOSIS — K50.812 CROHN'S DISEASE OF BOTH SMALL AND LARGE INTESTINE WITH INTESTINAL OBSTRUCTION (H): Primary | ICD-10-CM

## 2025-06-25 DIAGNOSIS — R69 DIAGNOSIS UNKNOWN: ICD-10-CM

## 2025-06-25 LAB
ALBUMIN SERPL BCG-MCNC: 4.5 G/DL (ref 3.5–5.2)
ALP SERPL-CCNC: 50 U/L (ref 40–150)
ALT SERPL W P-5'-P-CCNC: 14 U/L (ref 0–50)
AST SERPL W P-5'-P-CCNC: 21 U/L (ref 0–45)
BILIRUB SERPL-MCNC: 0.4 MG/DL
BILIRUBIN DIRECT (ROCHE PRO & PURE): 0.2 MG/DL (ref 0–0.45)
CREAT SERPL-MCNC: 0.84 MG/DL (ref 0.51–0.95)
CRP SERPL-MCNC: <3 MG/L
EGFRCR SERPLBLD CKD-EPI 2021: 88 ML/MIN/1.73M2
ERYTHROCYTE [DISTWIDTH] IN BLOOD BY AUTOMATED COUNT: 12.4 % (ref 10–15)
ERYTHROCYTE [SEDIMENTATION RATE] IN BLOOD BY WESTERGREN METHOD: 7 MM/HR (ref 0–20)
HCT VFR BLD AUTO: 39.2 % (ref 35–47)
HGB BLD-MCNC: 13.3 G/DL (ref 11.7–15.7)
MCH RBC QN AUTO: 30.6 PG (ref 26.5–33)
MCHC RBC AUTO-ENTMCNC: 33.9 G/DL (ref 31.5–36.5)
MCV RBC AUTO: 90 FL (ref 78–100)
PLATELET # BLD AUTO: 279 10E3/UL (ref 150–450)
PROT SERPL-MCNC: 7 G/DL (ref 6.4–8.3)
RBC # BLD AUTO: 4.34 10E6/UL (ref 3.8–5.2)
VIT B12 SERPL-MCNC: 434 PG/ML (ref 232–1245)
WBC # BLD AUTO: 6.2 10E3/UL (ref 4–11)

## 2025-06-25 PROCEDURE — 82248 BILIRUBIN DIRECT: CPT | Performed by: INTERNAL MEDICINE

## 2025-06-25 PROCEDURE — 82565 ASSAY OF CREATININE: CPT | Performed by: INTERNAL MEDICINE

## 2025-06-25 PROCEDURE — 86140 C-REACTIVE PROTEIN: CPT | Performed by: INTERNAL MEDICINE

## 2025-06-25 PROCEDURE — 99000 SPECIMEN HANDLING OFFICE-LAB: CPT | Performed by: PATHOLOGY

## 2025-06-25 PROCEDURE — 36415 COLL VENOUS BLD VENIPUNCTURE: CPT | Performed by: INTERNAL MEDICINE

## 2025-06-25 PROCEDURE — 82607 VITAMIN B-12: CPT | Performed by: INTERNAL MEDICINE

## 2025-06-25 NOTE — PATIENT INSTRUCTIONS
It was a pleasure meeting with you today and discussing your healthcare plan. Below is a summary of what we covered:    -- Labs at your convenience    -- Colonoscopy in [place]    Follow-up in 1 year.       Please see below for any additional questions and scheduling guidelines.    Sign up for [employer]: [employer] patient portal serves as a secure platform for accessing your medical records from the [facility]. Additionally, [employer] facilitates easy, timely, and secure messaging with your care team. If you have not signed up, you may do so by using the provided code or calling [phone].    Coordinating your care after your visit:  There are multiple options for scheduling your follow-up care based on your provider's recommendation.    How do I schedule a follow-up clinic appointment:   After your appointment, you may receive scheduling assistance with the Clinic Coordinators by having a seat in the waiting room and a Clinic Coordinator will call you up to schedule.  Virtual visits or after you leave the clinic:  Your provider has placed a follow-up order in the [employer] portal for scheduling your return appointment. A member of the scheduling team will contact you to schedule.  [employer] Scheduling: Timely scheduling through [employer] is advised to ensure appointment availability.   Call to schedule: You may schedule your follow-up appointment(s) by calling [phone], option 1.    How do I schedule my endoscopy or colonoscopy procedure:  If a procedure, such as a colonoscopy or upper endoscopy was ordered by your provider, the scheduling team will contact you to schedule this procedure. Or you may choose to call to schedule at   [phone], option 2.  Please allow 20-30 minutes when scheduling a procedure.    How do I get my blood work done? To get your blood work done, you need to schedule a lab appointment at an [facility]. There are multiple ways to schedule:   At the clinic: The Clinic  Coordinator you meet after your visit can help you schedule a lab appointment.   [employer] scheduling: [employer] offers online lab scheduling at all [facility] laboratory locations.   Call to schedule: You can call [phone] to schedule your lab appointment.    How do I schedule my imaging study: To schedule imaging studies, such as CT scans, ultrasounds, MRIs, or X-rays, contact Imaging Services at [phone].    How do I schedule a referral to another doctor: If your provider recommended a referral to another specialist(s), the referral order was placed by your provider. You will receive a phone call to schedule this referral, or you may choose to call the number attached to the referral to self-schedule.    For Post-Visit Question(s):  For any inquiries following today's visit:  Please utilize [employer] messaging and allow 48 hours for reply or contact the Call Center during normal business hours at [phone], option 3.  For Emergent After-hours questions, contact the On-Call GI Fellow through the   at [phone].  In addition, you may contact your Nurse directly using the provided contact information.    Test Results:  Test results will be accessible via [employer] in compliance with the 21st Century Cures Act. This means that your results will be available to you at the same time as your provider. Often you may see your results before your provider does. Results are reviewed by staff within two weeks with communication follow-up. Results may be released in the patient portal prior to your care team review.    Prescription Refill(s):  Medication prescribed by your provider will be addressed during your visit. For future refills, please coordinate with your pharmacy. If you have not had a recent clinic visit or routine labs, for your safety, your provider may not be able to refill your prescription.

## 2025-06-25 NOTE — PROGRESS NOTES
IBD CLINIC VISIT    CC/REFERRING MD:  Referred Self  REASON FOR CONSULTATION: Crohn's disease    ASSESSMENT/PLAN:  40 year old female with Crohn's disease    1. Crohn's disease:   Current medication: None  Current clinical disease activity: Remission, HBI 0   Last endoscopic disease activity: Normal, 8/16/2019  Last radiographic disease activity: MRE, no inflammation, 12/23/2019    Crohn's disease in clinical remission.  Last formal IBD staging in 2019 with normal colonoscopy and MRE.  She has essentially been in remission since her surgery.  Additionally she has had minimal to no symptoms since her most recent pregnancy, a few years prior.  Previously she would have symptomatic presumed Crohn's related abdominal pain that would respond to Entocort.    We discussed the natural history of her Crohn's today.  She has done exceedingly well postoperatively.  Discussed risk of anastomotic strictures and potential need for dilation.  Tentative plan to continue close observation.  Should she develop recurrence of disease, we can discuss options including mesalamine or advanced therapies.    -- Colonoscopy for Crohn's disease assessment and colon cancer screening  -- Labs today including CBC, LFTs, inflammatory markers, vitamin B12    2. IBD dysplasia surveillance: Report of histologic Crohn's activity in colon.  Anticipate IBD dysplasia surveillance every 3 to 5 years.    Return to clinic in 12 months    Thank you for this consultation.  It was a pleasure to participate in the care of this patient; please contact us with any further questions.  A total of 45 minutes was spent with this patient on the date of the encounter, 6/25/2025, in the following care activities: Chart review, patient care and documentation.      This note was created with voice recognition software, and while reviewed for accuracy, typos may remain.     Yoni Carlson MD MS  Associate Professor of Medicine  Division of Gastroenterology, Hepatology and  Nutrition  [facility]  Pager: [phone]       IBD HISTORY  Age at diagnosis: [age] 2005  Endoscopic extent of disease: ileocecal  Histologic Extent of disease: Ileum, ? colon  Disease phenotype: Stricturing   Asiya-anal disease: none  Prior IBD surgeries:   - IC resection since 2003  Prior IBD Medications:  Steroids  Pensata / asacol    DRUG MONITORING  TPMT enzyme activity:     6-TGN/6-MMPN levels:    Biologic concentration:    HPI:   Patient is here today to establish care for Crohn's disease.  She is looking for someone to help manage her Crohn's for the long-term and determine what standard of care she should have.    She was diagnosed with Crohn's disease in 2003 with severe abdominal pain.  She notes that her symptoms probably started as far back as eighth grade.  2003 she was having bloody stools and abdominal pain.  She had a flexible sigmoidoscopy that was normal.  However 6 months later she had a perforated small bowel and small bowel resection.  Within 6 months she had an anastomosis stricture.  She was on and off steroids over this time and eventually was treated with mesalamine products with Asacol and Pentasa.    On subsequent endoscopies she was not having endoscopic disease activity and ultimately her Pentasa was stopped.    She has had histologic Crohn's activity of the colon per her report.    Since her surgery she would have periodic flares of abdominal pain that she related to Crohn's disease.  This was managed by combination of dietary changes, stress reduction, and ultimately Entocort.  She reports that her pain decreased after she graduated school.  Also cutting out gluten and dairy helped.  However the biggest change has been her most recent pregnancy.  She does not believe she has had her Crohn's related abdominal pain since pregnancy.    Last formal Crohn's evaluation was in 2019 with colonoscopy and MR enterography all of which were normal.    Additionally her Crohn's has been  "complicated by vitamin B12 deficiency.  She is intermittently taking B12.  She does have chronic SI pain.  She has a 1 episode of uveitis although ultimately does not think this was related to IBD and it has not recurred.    Currently:  Thinks that she has a rectocele. Has done pelvic floor PT. No issues now.   Has 3-4 stools, bristol 4.   No blood in stools    When she has a flare of symptoms she will feel \"yucky\": Abdominal pain - gnawing ache and joint pain. Associated with bloating.     HBI:  Overall patient well being (prior day): 0 (Very well)  Abdominal pain (prior day): 0 (None)  Number of liquid or soft stools (prior day): 0 (1 point per stool)  Abdominal mass on exam: --  Complications (1 point for each):   None    Remission <5  Mild activity 5-7  Moderate activity 8-16  Severe > 16     Extra intestinal manifestations of IBD:  No uveitis/episcleritis  No aphthous ulcers   No arthritis   No erythema nodosum/pyoderma gangrenosum.     sIBDQ:  IBDQ Score Date IBDQ - Total Score  (Higher score better)   6/24/2025   9:18 AM 52        ROS:    Constitutional, HEENT, cardiovascular, pulmonary, GI, , musculoskeletal, neuro, skin, endocrine and psych systems are negative, except as otherwise noted.    PHYSICAL EXAMINATION:  Constitutional: aaox3, cooperative, pleasant, not dyspneic/diaphoretic, no acute distress  Vitals reviewed: There were no vitals taken for this visit.  Wt:   Wt Readings from Last 2 Encounters:   12/09/14 49 kg (108 lb)   12/09/14 49 kg (108 lb)      Constitutional - general appearance is well and in no acute distress. Body habitus normal  Eyes - No redness or discharge  Respiratory - No cough, unlabored breathing  Musculoskeletal - range of motion intact: Neck and arms  Skin - No discoloration or lesions  Neurological - No tremors, headaches  Psychiatric - No anxiety, alert & oriented     PERTINENT PAST MEDICAL HISTORY:  Past Medical History:   Diagnosis Date    Bowel perforation (H) 2003    " "Childhood asthma     Crohn's disease (H) 2003    Primary GI: Geeta Cooper       PREVIOUS SURGERIES:  Past Surgical History:   Procedure Laterality Date    CARDIAC ELECTROPHYSIOLOGY MAPPING AND ABLATION  2013    HX SVT    OTHER SURGICAL HISTORY  2012    Cardiac Ablation    SMALL BOWEL RESECTION  2003    Along with an Appy    [facility] APPENDECTOMY  2003    Description: Appendectomy;  Recorded: 11/19/2009;    [facility] ORAL SURGERY PROCEDURE  2001    4 wisdom teeth    [facility] RESECT SMALL INTEST,SINGL RESEC/ANAS  2003    Description: Small Bowel Resection;  Recorded: 11/19/2009;       ALLERGIES:     Allergies   Allergen Reactions    Gluten Meal Other (See Comments)     Causes bloating    Milk (Cow) Other (See Comments) and Nausea and Vomiting     Mouth sores        PERTINENT MEDICATIONS:    Current Outpatient Medications:     ALBUTEROL IN, , Disp: , Rfl:     alum hydroxide-mag trisilicate (GAVISCON) 80-14.2 MG CHEW chewable tablet, Take 2 tablets by mouth every 4 hours as needed for indigestion, Disp: , Rfl:     FOLIC ACID 1 MG OR TABS, ONE DAILY (Patient not taking: Reported on 5/24/2022), Disp: 3 MONTHS, Rfl: 1 YEAR    montelukast (SINGULAIR) 10 MG tablet, Take 1 tablet by mouth At Bedtime, Disp: , Rfl:     omeprazole (PRILOSEC) 20 MG DR capsule, Take 20 mg by mouth daily, Disp: , Rfl:     PRENATAL VITAMIN TABS   OR, ONE DAILY, Disp: 3 MONTHS, Rfl: 1 YEAR    syringe 22G X 1-1/2\" 3 ML MISC, FOR USE WITH VITAMIN B12 ONCE A WEEK, Disp: , Rfl:     VITAMIN B-12 1000 MCG OR TABS, , Disp: , Rfl: 0    SOCIAL HISTORY:  Social History     Socioeconomic History    Marital status:      Spouse name: Be    Number of children: 1    Years of education: Not on file    Highest education level: Not on file   Occupational History    Occupation: Nurse     Employer: [employer]   Tobacco Use    Smoking status: Never    Smokeless tobacco: Not on file   Substance and Sexual Activity    Alcohol use: No    Drug use: No    " Sexual activity: Yes     Partners: Male   Other Topics Concern    Not on file   Social History Narrative    Living arrangements - the patient lives with their family.      Social Drivers of Health     Financial Resource Strain: Low Risk  (3/18/2025)    Received from [employer]    Financial Resource Strain     Difficulty of Paying Living Expenses: 3     Difficulty of Paying Living Expenses: Not on file   Food Insecurity: No Food Insecurity (3/18/2025)    Received from [employer]    Food Insecurity     Do you worry your food will run out before you are able to buy more?: 1   Transportation Needs: No Transportation Needs (3/18/2025)    Received from [employer]    Transportation Needs     Does lack of transportation keep you from medical appointments?: 1     Does lack of transportation keep you from work, meetings or getting things that you need?: 1   Physical Activity: Not on file   Stress: Not on file   Social Connections: Socially Integrated (3/18/2025)    Received from [employer]    Social Connections     Do you often feel lonely or isolated from those around you?: 0   Interpersonal Safety: Not on file   Housing Stability: Low Risk  (3/18/2025)    Received from [employer]    Housing Stability     What is your housing situation today?: 1       FAMILY HISTORY:  Family History   Problem Relation Age of Onset    Asthma Mother     Asthma Sister     Asthma Sister     Asthma Brother     Asthma Brother     Hypertension Maternal Grandmother     Lipids Maternal Grandmother     Diabetes Maternal Grandmother         ? type 2    Hyperlipidemia Maternal Grandmother     Alzheimer Disease Paternal Grandmother     Arthritis Paternal Grandmother     Colon Cancer Paternal Uncle 50 - 59       Past/family/social history reviewed and no changes

## 2025-06-25 NOTE — LETTER
6/25/2025      Cecily Maradiaga  [address]  [facility] [address]      Dear Colleague,    Thank you for referring your patient, Cecily Maradiaga, to the [facility] [place]. Please see a copy of my visit note below.    Virtual Visit Details    Type of service:  Video Visit     Originating Location (pt. Location): Home    Distant Location (provider location):  On-site  Platform used for Video Visit: [facility]      IBD CLINIC VISIT    CC/REFERRING MD:  Referred Self  REASON FOR CONSULTATION: Crohn's disease    ASSESSMENT/PLAN:  40 year old female with Crohn's disease    1. Crohn's disease:   Current medication: None  Current clinical disease activity: Remission, HBI 0   Last endoscopic disease activity: Normal, 8/16/2019  Last radiographic disease activity: MRE, no inflammation, 12/23/2019    Crohn's disease in clinical remission.  Last formal IBD staging in 2019 with normal colonoscopy and MRE.  She has essentially been in remission since her surgery.  Additionally she has had minimal to no symptoms since her most recent pregnancy, a few years prior.  Previously she would have symptomatic presumed Crohn's related abdominal pain that would respond to Entocort.    We discussed the natural history of her Crohn's today.  She has done exceedingly well postoperatively.  Discussed risk of anastomotic strictures and potential need for dilation.  Tentative plan to continue close observation.  Should she develop recurrence of disease, we can discuss options including mesalamine or advanced therapies.    -- Colonoscopy for Crohn's disease assessment and colon cancer screening  -- Labs today including CBC, LFTs, inflammatory markers, vitamin B12    2. IBD dysplasia surveillance: Report of histologic Crohn's activity in colon.  Anticipate IBD dysplasia surveillance every 3 to 5 years.    Return to clinic in 12 months    Thank you for this consultation.  It was a pleasure to participate  in the care of this patient; please contact us with any further questions.  A total of 45 minutes was spent with this patient on the date of the encounter, 6/25/2025, in the following care activities: Chart review, patient care and documentation.      This note was created with voice recognition software, and while reviewed for accuracy, typos may remain.     Yoni Carlson MD MS  Associate Professor of Medicine  Division of Gastroenterology, Hepatology and Nutrition  [facility]  Pager: [phone]       IBD HISTORY  Age at diagnosis: [age], 2005  Endoscopic extent of disease: ileocecal  Histologic Extent of disease: Ileum, ? colon  Disease phenotype: Stricturing   Asiya-anal disease: none  Prior IBD surgeries:   - IC resection since 2003  Prior IBD Medications:  Steroids  Pensata / asacol    DRUG MONITORING  TPMT enzyme activity:     6-TGN/6-MMPN levels:    Biologic concentration:    HPI:   Patient is here today to establish care for Crohn's disease.  She is looking for someone to help manage her Crohn's for the long-term and determine what standard of care she should have.    She was diagnosed with Crohn's disease in 2003 with severe abdominal pain.  She notes that her symptoms probably started as far back as eighth grade.  2003 she was having bloody stools and abdominal pain.  She had a flexible sigmoidoscopy that was normal.  However 6 months later she had a perforated small bowel and small bowel resection.  Within 6 months she had an anastomosis stricture.  She was on and off steroids over this time and eventually was treated with mesalamine products with Asacol and Pentasa.    On subsequent endoscopies she was not having endoscopic disease activity and ultimately her Pentasa was stopped.    She has had histologic Crohn's activity of the colon per her report.    Since her surgery she would have periodic flares of abdominal pain that she related to Crohn's disease.  This was managed by combination of dietary  "changes, stress reduction, and ultimately Entocort.  She reports that her pain decreased after she graduated school.  Also cutting out gluten and dairy helped.  However the biggest change has been her most recent pregnancy.  She does not believe she has had her Crohn's related abdominal pain since pregnancy.    Last formal Crohn's evaluation was in 2019 with colonoscopy and MR enterography all of which were normal.    Additionally her Crohn's has been complicated by vitamin B12 deficiency.  She is intermittently taking B12.  She does have chronic SI pain.  She has a 1 episode of uveitis although ultimately does not think this was related to IBD and it has not recurred.    Currently:  Thinks that she has a rectocele. Has done pelvic floor PT. No issues now.   Has 3-4 stools, bristol 4.   No blood in stools    When she has a flare of symptoms she will feel \"yucky\": Abdominal pain - gnawing ache and joint pain. Associated with bloating.     HBI:  Overall patient well being (prior day): 0 (Very well)  Abdominal pain (prior day): 0 (None)  Number of liquid or soft stools (prior day): 0 (1 point per stool)  Abdominal mass on exam: --  Complications (1 point for each):   None    Remission <5  Mild activity 5-7  Moderate activity 8-16  Severe > 16     Extra intestinal manifestations of IBD:  No uveitis/episcleritis  No aphthous ulcers   No arthritis   No erythema nodosum/pyoderma gangrenosum.     sIBDQ:  IBDQ Score Date IBDQ - Total Score  (Higher score better)   6/24/2025   9:18 AM 52        ROS:    Constitutional, HEENT, cardiovascular, pulmonary, GI, , musculoskeletal, neuro, skin, endocrine and psych systems are negative, except as otherwise noted.    PHYSICAL EXAMINATION:  Constitutional: aaox3, cooperative, pleasant, not dyspneic/diaphoretic, no acute distress  Vitals reviewed: There were no vitals taken for this visit.  Wt:   Wt Readings from Last 2 Encounters:   12/09/14 49 kg (108 lb)   12/09/14 49 kg (108 lb)    " "  Constitutional - general appearance is well and in no acute distress. Body habitus normal  Eyes - No redness or discharge  Respiratory - No cough, unlabored breathing  Musculoskeletal - range of motion intact: Neck and arms  Skin - No discoloration or lesions  Neurological - No tremors, headaches  Psychiatric - No anxiety, alert & oriented     PERTINENT PAST MEDICAL HISTORY:  Past Medical History:   Diagnosis Date     Bowel perforation (H) 2003     Childhood asthma      Crohn's disease (H) 2003    Primary GI: Dr. Carrasco Geeta       PREVIOUS SURGERIES:  Past Surgical History:   Procedure Laterality Date     CARDIAC ELECTROPHYSIOLOGY MAPPING AND ABLATION  2013    HX SVT     OTHER SURGICAL HISTORY  2012    Cardiac Ablation     SMALL BOWEL RESECTION  2003    Along with an Appy     [facility] [place] APPENDECTOMY  2003    Description: Appendectomy;  Recorded: 11/19/2009;     [facility] [place] ORAL SURGERY PROCEDURE  2001    4 wisdom teeth     [facility] [place] RESECT SMALL INTEST,SINGL RESEC/ANAS  2003    Description: Small Bowel Resection;  Recorded: 11/19/2009;       ALLERGIES:     Allergies   Allergen Reactions     Gluten Meal Other (See Comments)     Causes bloating     Milk (Cow) Other (See Comments) and Nausea and Vomiting     Mouth sores        PERTINENT MEDICATIONS:    Current Outpatient Medications:      ALBUTEROL IN, , Disp: , Rfl:      alum hydroxide-mag trisilicate (GAVISCON) 80-14.2 MG CHEW chewable tablet, Take 2 tablets by mouth every 4 hours as needed for indigestion, Disp: , Rfl:      FOLIC ACID 1 MG OR TABS, ONE DAILY (Patient not taking: Reported on 5/24/2022), Disp: 3 MONTHS, Rfl: 1 YEAR     montelukast (SINGULAIR) 10 MG tablet, Take 1 tablet by mouth At Bedtime, Disp: , Rfl:      omeprazole (PRILOSEC) 20 MG DR capsule, Take 20 mg by mouth daily, Disp: , Rfl:      PRENATAL VITAMIN TABS   OR, ONE DAILY, Disp: 3 MONTHS, Rfl: 1 YEAR     syringe 22G X 1-1/2\" 3 ML MISC, FOR USE WITH VITAMIN B12 ONCE A WEEK, Disp: , Rfl:      VITAMIN B-12 " 1000 MCG OR TABS, , Disp: , Rfl: 0    SOCIAL HISTORY:  Social History     Socioeconomic History     Marital status:      Spouse name: Be     Number of children: 1     Years of education: Not on file     Highest education level: Not on file   Occupational History     Occupation: Nurse     Employer: [employer]   Tobacco Use     Smoking status: Never     Smokeless tobacco: Not on file   Substance and Sexual Activity     Alcohol use: No     Drug use: No     Sexual activity: Yes     Partners: Male   Other Topics Concern     Not on file   Social History Narrative    Living arrangements - the patient lives with their family.      Social Drivers of Health     Financial Resource Strain: Low Risk  (3/18/2025)    Received from [employer]    Financial Resource Strain      Difficulty of Paying Living Expenses: 3      Difficulty of Paying Living Expenses: Not on file   Food Insecurity: No Food Insecurity (3/18/2025)    Received from [employer]    Food Insecurity      Do you worry your food will run out before you are able to buy more?: 1   Transportation Needs: No Transportation Needs (3/18/2025)    [facility]    Transportation Needs      Does lack of transportation keep you from medical appointments?: 1      Does lack of transportation keep you from work, meetings or getting things that you need?: 1   Physical Activity: Not on file   Stress: Not on file   Social Connections: Socially Integrated (3/18/2025)    Received from [employer]    Social Connections      Do you often feel lonely or isolated from those around you?: 0   Interpersonal Safety: Not on file   Housing Stability: Low Risk  (3/18/2025)    Received from [employer]    Housing Stability      What is your housing situation today?: 1       FAMILY HISTORY:  Family  History   Problem Relation Age of Onset     Asthma Mother      Asthma Sister      Asthma Sister      Asthma Brother      Asthma Brother      Hypertension Maternal Grandmother      Lipids Maternal Grandmother      Diabetes Maternal Grandmother         ? type 2     Hyperlipidemia Maternal Grandmother      Alzheimer Disease Paternal Grandmother      Arthritis Paternal Grandmother      Colon Cancer Paternal Uncle 50 - 59       Past/family/social history reviewed and no changes          Again, thank you for allowing me to participate in the care of your patient.        Sincerely,        Yoni Carlson MD    Electronically signed

## 2025-06-25 NOTE — PROGRESS NOTES
Virtual Visit Details    Type of service:  Video Visit     Originating Location (pt. Location): Home    Distant Location (provider location):  On-site  Platform used for Video Visit: [employer]

## 2025-06-25 NOTE — NURSING NOTE
Current patient location: [id number] [place] [id number]    Is the patient currently in the state of [place]? YES    Visit mode: VIDEO    If the visit is dropped, the patient can be reconnected by:VIDEO VISIT: Text to cell phone:   Telephone Information:   Mobile [phone]       Will anyone else be joining the visit? NO  (If patient encounters technical issues they should call [phone]    Are changes needed to the allergy or medication list? No    Are refills needed on medications prescribed by this physician? NO    Rooming Documentation:  Not applicable    Reason for visit: Consult    Vaibhav LEONARDO

## 2025-06-26 ENCOUNTER — RESULTS FOLLOW-UP (OUTPATIENT)
Dept: GASTROENTEROLOGY | Facility: CLINIC | Age: 40
End: 2025-06-26

## 2025-08-24 ENCOUNTER — HEALTH MAINTENANCE LETTER (OUTPATIENT)
Age: 40
End: 2025-08-24